# Patient Record
Sex: FEMALE | Race: BLACK OR AFRICAN AMERICAN | NOT HISPANIC OR LATINO | Employment: FULL TIME | URBAN - METROPOLITAN AREA
[De-identification: names, ages, dates, MRNs, and addresses within clinical notes are randomized per-mention and may not be internally consistent; named-entity substitution may affect disease eponyms.]

---

## 2023-10-09 ENCOUNTER — OFFICE VISIT (OUTPATIENT)
Dept: INTERNAL MEDICINE | Facility: CLINIC | Age: 48
End: 2023-10-09
Payer: COMMERCIAL

## 2023-10-09 VITALS
BODY MASS INDEX: 39.13 KG/M2 | SYSTOLIC BLOOD PRESSURE: 122 MMHG | DIASTOLIC BLOOD PRESSURE: 76 MMHG | WEIGHT: 199.31 LBS | HEIGHT: 60 IN | OXYGEN SATURATION: 98 % | TEMPERATURE: 98 F | HEART RATE: 86 BPM

## 2023-10-09 DIAGNOSIS — E66.09 CLASS 2 OBESITY DUE TO EXCESS CALORIES WITH BODY MASS INDEX (BMI) OF 38.0 TO 38.9 IN ADULT, UNSPECIFIED WHETHER SERIOUS COMORBIDITY PRESENT: ICD-10-CM

## 2023-10-09 DIAGNOSIS — E11.9 TYPE 2 DIABETES MELLITUS WITHOUT COMPLICATION, WITHOUT LONG-TERM CURRENT USE OF INSULIN: ICD-10-CM

## 2023-10-09 DIAGNOSIS — I10 PRIMARY HYPERTENSION: Primary | ICD-10-CM

## 2023-10-09 DIAGNOSIS — E55.9 VITAMIN D DEFICIENCY: ICD-10-CM

## 2023-10-09 PROCEDURE — 99999 PR PBB SHADOW E&M-EST. PATIENT-LVL IV: ICD-10-PCS | Mod: PBBFAC,,, | Performed by: FAMILY MEDICINE

## 2023-10-09 PROCEDURE — 99204 OFFICE O/P NEW MOD 45 MIN: CPT | Mod: S$GLB,,, | Performed by: FAMILY MEDICINE

## 2023-10-09 PROCEDURE — 99999 PR PBB SHADOW E&M-EST. PATIENT-LVL IV: CPT | Mod: PBBFAC,,, | Performed by: FAMILY MEDICINE

## 2023-10-09 PROCEDURE — 99204 PR OFFICE/OUTPT VISIT, NEW, LEVL IV, 45-59 MIN: ICD-10-PCS | Mod: S$GLB,,, | Performed by: FAMILY MEDICINE

## 2023-10-09 RX ORDER — CYCLOBENZAPRINE HCL 10 MG
TABLET ORAL
COMMUNITY
Start: 2018-01-01

## 2023-10-09 RX ORDER — GABAPENTIN 100 MG/1
CAPSULE ORAL
COMMUNITY
Start: 2018-01-01

## 2023-10-09 RX ORDER — METFORMIN HYDROCHLORIDE 500 MG/1
500 TABLET ORAL DAILY
Qty: 90 TABLET | Refills: 2 | Status: SHIPPED | OUTPATIENT
Start: 2023-10-09

## 2023-10-09 NOTE — PROGRESS NOTES
Subjective     Patient ID: Raul Lechuga is a 47 y.o. female.    Chief Complaint: hospitals Care    47 year old female presents to [facility]. Patient has PMH of  htn and DM2. Patient is overall doing well and reports it is time for lab work. Patient reports she has gained weight.    Hypertension  This is a chronic problem. The current episode started more than 1 year ago. The problem is unchanged. The problem is controlled. Pertinent negatives include no anxiety, blurred vision, chest pain, headaches, malaise/fatigue, neck pain, orthopnea, palpitations, peripheral edema, PND, shortness of breath or sweats. Agents associated with hypertension include NSAIDs. Risk factors for coronary artery disease include diabetes mellitus, family history, obesity, post-menopausal state and sedentary lifestyle. Past treatments include beta blockers, calcium channel blockers, diuretics and lifestyle changes. The current treatment provides significant improvement. Compliance problems include exercise.      Review of Systems   Constitutional: Negative.  Negative for malaise/fatigue.   HENT: Negative.     Eyes:  Negative for blurred vision, discharge and redness.   Respiratory: Negative.  Negative for shortness of breath.    Cardiovascular: Negative.  Negative for chest pain, palpitations, orthopnea, leg swelling and PND.   Gastrointestinal: Negative.  Negative for constipation and diarrhea.   Musculoskeletal: Negative.  Negative for arthralgias, gait problem and neck pain.   Neurological: Negative.  Negative for headaches and coordination difficulties.   Psychiatric/Behavioral: Negative.            Objective     Physical Exam  Vitals and nursing note reviewed.   Constitutional:       Appearance: Normal appearance. She is obese.   HENT:      Head: Normocephalic and atraumatic.   Eyes:      Extraocular Movements: Extraocular movements intact.   Cardiovascular:      Rate and Rhythm: Normal rate and regular rhythm.      Pulses:  Normal pulses.      Heart sounds: Normal heart sounds.   Pulmonary:      Effort: Pulmonary effort is normal.      Breath sounds: Normal breath sounds.   Abdominal:      General: Abdomen is flat. Bowel sounds are normal.      Palpations: Abdomen is soft.   Musculoskeletal:      Cervical back: Neck supple.      Right lower leg: No edema.      Left lower leg: No edema.   Skin:     General: Skin is warm and dry.   Neurological:      General: No focal deficit present.      Mental Status: She is alert and oriented to person, place, and time.   Psychiatric:         Mood and Affect: Mood normal.         Behavior: Behavior normal.            Assessment and Plan     1. Primary hypertension  Comments:  controlled cont current tx  Orders:  -     CBC W/ AUTO DIFFERENTIAL; Future; Expected date: 10/09/2023  -     COMPREHENSIVE METABOLIC PANEL; Future; Expected date: 10/09/2023  -     LIPID PANEL; Future; Expected date: 10/09/2023  -     TSH; Future; Expected date: 11/09/2023  -     T4, free; Future; Expected date: 11/09/2023    2. Type 2 diabetes mellitus without complication, without long-term current use of insulin  Comments:  on metformin therapy, check a1c  Orders:  -     HEMOGLOBIN A1C; Future; Expected date: 11/09/2023  -     metFORMIN (GLUCOPHAGE) 500 MG tablet; Take 1 tablet (500 mg total) by mouth Daily.  Dispense: 90 tablet; Refill: 2    3. Vitamin D deficiency  Comments:  on supplementation , check level to assess need to adjust dosage  Orders:  -     Calcitriol; Future; Expected date: 10/09/2023    4. Class 2 obesity due to excess calories with body mass index (BMI) of 38.0 to 38.9 in adult, unspecified whether serious comorbidity present  Comments:  discussed increasing physical activity and clean eating             No follow-ups on file.

## 2023-10-10 ENCOUNTER — PATIENT MESSAGE (OUTPATIENT)
Dept: ADMINISTRATIVE | Facility: HOSPITAL | Age: 48
End: 2023-10-10
Payer: COMMERCIAL

## 2023-11-22 DIAGNOSIS — Z12.31 OTHER SCREENING MAMMOGRAM: ICD-10-CM

## 2024-02-06 DIAGNOSIS — Z12.11 COLON CANCER SCREENING: ICD-10-CM

## 2024-07-18 ENCOUNTER — PATIENT MESSAGE (OUTPATIENT)
Dept: INTERNAL MEDICINE | Facility: CLINIC | Age: 49
End: 2024-07-18
Payer: COMMERCIAL

## 2024-07-19 LAB — HIV1/HIV2 ANTIBODY: NONREACTIVE

## 2024-07-23 ENCOUNTER — OFFICE VISIT (OUTPATIENT)
Dept: INTERNAL MEDICINE | Facility: CLINIC | Age: 49
End: 2024-07-23
Payer: COMMERCIAL

## 2024-07-23 VITALS
BODY MASS INDEX: 39 KG/M2 | SYSTOLIC BLOOD PRESSURE: 132 MMHG | DIASTOLIC BLOOD PRESSURE: 82 MMHG | TEMPERATURE: 99 F | WEIGHT: 206.56 LBS | HEIGHT: 61 IN | HEART RATE: 85 BPM | OXYGEN SATURATION: 97 %

## 2024-07-23 DIAGNOSIS — G89.29 CHRONIC LOW BACK PAIN, UNSPECIFIED BACK PAIN LATERALITY, UNSPECIFIED WHETHER SCIATICA PRESENT: ICD-10-CM

## 2024-07-23 DIAGNOSIS — E66.01 CLASS 2 SEVERE OBESITY DUE TO EXCESS CALORIES WITH SERIOUS COMORBIDITY AND BODY MASS INDEX (BMI) OF 39.0 TO 39.9 IN ADULT: ICD-10-CM

## 2024-07-23 DIAGNOSIS — I10 PRIMARY HYPERTENSION: Primary | ICD-10-CM

## 2024-07-23 DIAGNOSIS — M54.50 CHRONIC LOW BACK PAIN, UNSPECIFIED BACK PAIN LATERALITY, UNSPECIFIED WHETHER SCIATICA PRESENT: ICD-10-CM

## 2024-07-23 DIAGNOSIS — E11.9 TYPE 2 DIABETES MELLITUS WITHOUT COMPLICATION, WITHOUT LONG-TERM CURRENT USE OF INSULIN: ICD-10-CM

## 2024-07-23 PROCEDURE — 99999 PR PBB SHADOW E&M-EST. PATIENT-LVL III: CPT | Mod: PBBFAC,,, | Performed by: FAMILY MEDICINE

## 2024-07-23 PROCEDURE — 99214 OFFICE O/P EST MOD 30 MIN: CPT | Mod: S$GLB,,, | Performed by: FAMILY MEDICINE

## 2024-07-23 RX ORDER — CYCLOBENZAPRINE HCL 10 MG
10 TABLET ORAL 3 TIMES DAILY PRN
Qty: 30 TABLET | Refills: 1 | Status: SHIPPED | OUTPATIENT
Start: 2024-07-23

## 2024-07-23 NOTE — PROGRESS NOTES
"Subjective     Patient ID: Raul Lechuga is a 48 y.o. female.    Chief Complaint: Follow-up    History of Present Illness    Raul presents today for follow up.    She reports significant weight gain since her last visit. She acknowledges a lack of exercise, no longer engaging in physical activity as she previously did. She admits to being less motivated to exercise, often choosing to sleep longer instead of maintaining her previous morning routine. Regarding diet, she reports poor eating habits, indicating a departure from her previous healthier eating patterns. She denies needing assistance with weight management or enrollment in a health program, expressing confidence in her ability to address the issue independently.    She reports a recent episode of elevated blood pressure of approximately 160 during her visit to the cardiologist, which was unusually high compared to her typical readings. The cardiologist had her wait in the office for a few minutes before rechecking the blood pressure. She expresses uncertainty about the cause of this elevated reading, noting that while her blood pressure is "not great," it is usually closer to her normal range. She denies chest pain.    She reports back pain, primarily characterized by muscle spasms. She uses stretching exercises and a massage chair at work for pain management. She expresses a preference for non-pharmacological management, stating she is "not a medicine person." When medication is necessary, she uses Flexeril (cyclobenzaprine) 5-10mg, but notes that the medication's sedating effects limit its usefulness to only at home when ready to sleep. She denies chest pain or other associated symptoms.    She reports facial spasms with negative CTs. Her doctor (Gloria) has stated that unless the spasms escalate, no further action is needed and suggested the possibility of trying Botox injections in the nerves if she wishes to pursue treatment, but she has " declined this option. She denies current severe pain or need for escalated treatment.    She reports needing a refill for Flexeril, which typically lasts her a year indicating minimal usage. She denies needing refills for other medications, mentioning she has sufficient supplies of gabapentin and other prescribed medications.    She reports recent completion of mammogram and pelvic exam with Dr. Story. The mammogram and all other tests performed were negative. She denies need for Pap smear at this time. She acknowledges need to schedule a colonoscopy and expresses interest in recommendations for providers.    ROS:  General: -fever, -chills, -fatigue, +weight gain, -weight loss  Eyes: -vision changes, -redness, -discharge  ENT: -ear pain, -nasal congestion, -sore throat  Cardiovascular: -chest pain, -palpitations, -lower extremity edema  Respiratory: -cough, -shortness of breath  Gastrointestinal: -abdominal pain, -nausea, -vomiting, -diarrhea, -constipation, -blood in stool  Genitourinary: -dysuria, -hematuria, -frequency  Musculoskeletal: -joint pain, -muscle pain, +back pain, +muscle spasms  Skin: -rash, -lesion  Neurological: -headache, -dizziness, -numbness, -tingling  Psychiatric: -anxiety, -depression, -sleep difficulty            Objective     Physical Exam  Vitals and nursing note reviewed.   Constitutional:       Appearance: Normal appearance. She is obese.   HENT:      Head: Normocephalic and atraumatic.   Eyes:      Extraocular Movements: Extraocular movements intact.      Conjunctiva/sclera: Conjunctivae normal.   Cardiovascular:      Rate and Rhythm: Normal rate and regular rhythm.      Pulses: Normal pulses.      Heart sounds: Normal heart sounds.   Pulmonary:      Effort: Pulmonary effort is normal.      Breath sounds: Normal breath sounds.   Abdominal:      General: Abdomen is flat. Bowel sounds are normal.      Palpations: Abdomen is soft.   Musculoskeletal:      Right lower leg: No edema.      Left  lower leg: No edema.   Skin:     General: Skin is warm and dry.   Neurological:      General: No focal deficit present.      Mental Status: She is alert and oriented to person, place, and time.   Psychiatric:         Mood and Affect: Mood normal.         Behavior: Behavior normal.                    Assessment and Plan     1. Primary hypertension    2. Type 2 diabetes mellitus without complication, without long-term current use of insulin    3. Chronic low back pain, unspecified back pain laterality, unspecified whether sciatica present  -     cyclobenzaprine (FLEXERIL) 10 MG tablet; Take 1 tablet (10 mg total) by mouth 3 (three) times daily as needed for Muscle spasms.  Dispense: 30 tablet; Refill: 1    4. Class 2 severe obesity due to excess calories with serious comorbidity and body mass index (BMI) of 39.0 to 39.9 in adult        Assessment & Plan    BACK PAIN:   Discussed non-pharmacologic strategies for managing back pain, including stretching against a wall.   The patient reports ongoing back pain and muscle spasms.   Acknowledged the patient's back pain and SI joint pain.   Discussed previous management strategies for back pain with the patient.   The patient uses non-pharmacological methods like massage chair for pain relief.   Discussed non-pharmacologic strategies for managing muscle spasms with the patient, including stretching against a wall.   Suggested the possibility of a pinched nerve in back causing the patient's facial spasms.  MUSCLE SPASMS:   The patient uses flexeril (cyclobenzaprine) 5-10mg for pain management, but experiences side effects.   Continued flexeril 10 mg as needed for the patient's muscle spasms.   Noted the patient takes flexeril minimally, with 1 prescription lasting a year.   Advised the patient to break flexeril in half if needed to reduce side effects.   The patient reports ongoing muscle spasms in back and face.   CTs are negative for the patient's facial spasms.   The  patient uses flexeril (cyclobenzaprine) for muscle spasms, but experiences side effects.   Suggested Botox injections as a potential treatment option for the patient's muscle spasms.  HYPERTENSION:   Continued the patient's current antihypertensive medications.   The patient's blood pressure was high during a recent visit with Dr. Knox, reaching 160/100 initially, then decreasing to 148/90-something.   The patient reports blood pressure has returned to normal levels in recent checks.   Noted that the patient's blood pressure is currently looking good.   Dr. Knox suggested the patient contact Dr. Moore if blood pressure remains elevated to consider medication changes.   The patient is on blood pressure medication, but may need refills or adjustments.  COLONOSCOPY:   Referred the patient to colonoscopy once the patient confirms an in-network provider.   Discussed potential providers with the patient, including Dr. Grissom, Dr. Hollis, and Dr. Cardoso.   Instructed the patient to let me know if referral is needed to an outside facility.   Advised the patient to use the patient portal to send a message once the patient determines where the patient wants to have the colonoscopy done so I can place the order.   Recommend colonoscopy as the initial screening method for the patient over Cologuard.   Offered to order a colonoscopy for the patient.  WEIGHT MANAGEMENT:   The patient has gained weight since last year and is now over 200 lbs.   Discussed the patient's weight gain and need to get back on track with diet and exercise.   Offered resources for weight management to the patient, including wellness centers and health programs.   Trinical to resume exercising regularly and eating a healthier diet to assist with weight loss efforts.   Trinical to enroll in wellness center program if needing additional motivation for lifestyle changes.  MEDICATIONS/SUPPLEMENTS:   Continued gabapentin at current dose.  FOLLOW UP:    Instructed the patient to contact the office if needing medication refills, including flexeril which is the only medication the patient is currently out of.              Follow up in about 6 months (around 1/23/2025).    This note was generated with the assistance of ambient listening technology. Verbal consent was obtained by the patient and accompanying visitor(s) for the recording of patient appointment to facilitate this note. I attest to having reviewed and edited the generated note for accuracy, though some syntax or spelling errors may persist. Please contact the author of this note for any clarification.

## 2024-07-24 ENCOUNTER — PATIENT MESSAGE (OUTPATIENT)
Dept: ADMINISTRATIVE | Facility: HOSPITAL | Age: 49
End: 2024-07-24
Payer: COMMERCIAL

## 2024-07-31 DIAGNOSIS — E11.9 TYPE 2 DIABETES MELLITUS WITHOUT COMPLICATION: ICD-10-CM

## 2024-08-01 RX ORDER — NEBIVOLOL 20 MG/1
1 TABLET ORAL
Qty: 90 TABLET | Refills: 3 | Status: SHIPPED | OUTPATIENT
Start: 2024-08-01

## 2024-08-01 RX ORDER — VALSARTAN AND HYDROCHLOROTHIAZIDE 320; 25 MG/1; MG/1
1 TABLET, FILM COATED ORAL
Qty: 90 TABLET | Refills: 3 | Status: SHIPPED | OUTPATIENT
Start: 2024-08-01

## 2024-08-01 NOTE — TELEPHONE ENCOUNTER
Care Due:                  Date            Visit Type   Department     Provider  --------------------------------------------------------------------------------                                MYCHART                              FOLLOWUP/OF  [facility] INTERNAL  Jami  Last Visit: 07-      FICE VISIT   AYALA Conway  Next Visit: None Scheduled  None         None Found                                                            Last  Test          Frequency    Reason                     Performed    Due Date  --------------------------------------------------------------------------------    Cr..........  12 months..  metFORMIN................  Not Found    Overdue    HBA1C.......  6 months...  metFORMIN................  Not Found    Overdue    [employer] Embedded Care Due Messages. Reference number: [id number].   8/01/2024 12:21:11 PM CDT

## 2024-08-01 NOTE — TELEPHONE ENCOUNTER
Refill Routing Note   Medication(s) are not appropriate for processing by [facility] for the following reason(s):        No active prescription written by provider    [facility] action(s):  Defer   Requires labs : Yes        Medication Therapy Plan: Historical Medication, medication not active.      Appointments  past 12m or future 3m with PCP    Date Provider   Last Visit   7/23/2024 Jami Conway MD   Next Visit   Visit date not found Jami Conway MD   ED visits in past 90 days: 0        Note composed:12:49 PM 08/01/2024

## 2024-12-29 NOTE — TELEPHONE ENCOUNTER
No care due was identified.  [facility] Embedded Care Due Messages. Reference number: [id number].   12/29/2024 10:36:09 AM CST

## 2024-12-30 RX ORDER — NEBIVOLOL 20 MG/1
1 TABLET ORAL DAILY
Qty: 90 TABLET | Refills: 1 | Status: SHIPPED | OUTPATIENT
Start: 2024-12-30

## 2024-12-30 NOTE — TELEPHONE ENCOUNTER
Refill Decision Note   Raul Lechuga  is requesting a refill authorization.  Brief Assessment and Rationale for Refill:  Approve     Medication Therapy Plan:         Comments:     Note composed:12:27 PM 12/30/2024

## 2025-03-10 RX ORDER — VALSARTAN AND HYDROCHLOROTHIAZIDE 320; 25 MG/1; MG/1
1 TABLET, FILM COATED ORAL DAILY
Qty: 90 TABLET | Refills: 3 | Status: SHIPPED | OUTPATIENT
Start: 2025-03-10

## 2025-03-10 NOTE — TELEPHONE ENCOUNTER
Care Due:                  Date            Visit Type   Department     Provider  --------------------------------------------------------------------------------                                MYCHART                              FOLLOWUP/OF  [facility] INTERNAL  Jami  Last Visit: 07-      FICE VISIT   AYALA Conway                              MYCHART                              FOLLOWUP/OF  [facility] INTERNAL  Jami  Next Visit: 03-      FICE VISIT   [facility]       Man                                                            Last  Test          Frequency    Reason                     Performed    Due Date  --------------------------------------------------------------------------------    CMP.........  12 months..  metFORMIN,                 Not Found    Overdue                             valsartan-hydrochlorothia                             zide.....................    HBA1C.......  6 months...  metFORMIN................  Not Found    Overdue    [employer] Embedded Care Due Messages. Reference number: [id number].   3/10/2025 11:42:08 AM CDT

## 2025-03-24 ENCOUNTER — OFFICE VISIT (OUTPATIENT)
Dept: INTERNAL MEDICINE | Facility: CLINIC | Age: 50
End: 2025-03-24
Payer: COMMERCIAL

## 2025-03-24 VITALS
HEIGHT: 61 IN | DIASTOLIC BLOOD PRESSURE: 96 MMHG | OXYGEN SATURATION: 99 % | TEMPERATURE: 98 F | BODY MASS INDEX: 38.88 KG/M2 | SYSTOLIC BLOOD PRESSURE: 144 MMHG | HEART RATE: 69 BPM | WEIGHT: 205.94 LBS

## 2025-03-24 DIAGNOSIS — E66.812 CLASS 2 SEVERE OBESITY DUE TO EXCESS CALORIES WITH SERIOUS COMORBIDITY AND BODY MASS INDEX (BMI) OF 38.0 TO 38.9 IN ADULT: ICD-10-CM

## 2025-03-24 DIAGNOSIS — K21.9 GASTROESOPHAGEAL REFLUX DISEASE WITHOUT ESOPHAGITIS: Chronic | ICD-10-CM

## 2025-03-24 DIAGNOSIS — E11.65 TYPE 2 DIABETES MELLITUS WITH HYPERGLYCEMIA, WITHOUT LONG-TERM CURRENT USE OF INSULIN: Chronic | ICD-10-CM

## 2025-03-24 DIAGNOSIS — Z12.11 SPECIAL SCREENING FOR MALIGNANT NEOPLASM OF COLON: ICD-10-CM

## 2025-03-24 DIAGNOSIS — Z00.00 ROUTINE GENERAL MEDICAL EXAMINATION AT A HEALTH CARE FACILITY: Primary | ICD-10-CM

## 2025-03-24 DIAGNOSIS — I10 PRIMARY HYPERTENSION: Chronic | ICD-10-CM

## 2025-03-24 DIAGNOSIS — E66.01 CLASS 2 SEVERE OBESITY DUE TO EXCESS CALORIES WITH SERIOUS COMORBIDITY AND BODY MASS INDEX (BMI) OF 38.0 TO 38.9 IN ADULT: ICD-10-CM

## 2025-03-24 PROCEDURE — 99999 PR PBB SHADOW E&M-EST. PATIENT-LVL IV: CPT | Mod: PBBFAC,,, | Performed by: FAMILY MEDICINE

## 2025-03-24 RX ORDER — TIRZEPATIDE 2.5 MG/.5ML
2.5 INJECTION, SOLUTION SUBCUTANEOUS
Qty: 4 PEN | Refills: 0 | Status: SHIPPED | OUTPATIENT
Start: 2025-03-24

## 2025-03-28 ENCOUNTER — PATIENT OUTREACH (OUTPATIENT)
Dept: ADMINISTRATIVE | Facility: HOSPITAL | Age: 50
End: 2025-03-28
Payer: COMMERCIAL

## 2025-03-28 NOTE — PROGRESS NOTES
[facility] DM EYE EXAM 3.25.2025 uploaded; faxed to DR. LOJA 1x to request. Reminder set.    Manually uploaded and hyper-linked HIV 7.19.2024 found in CE.   Name band;

## 2025-04-10 PROBLEM — E11.65 TYPE 2 DIABETES MELLITUS WITH HYPERGLYCEMIA, WITHOUT LONG-TERM CURRENT USE OF INSULIN: Chronic | Status: ACTIVE | Noted: 2025-04-10

## 2025-04-10 PROBLEM — I10 PRIMARY HYPERTENSION: Chronic | Status: ACTIVE | Noted: 2025-04-10

## 2025-04-10 PROBLEM — K21.9 GASTROESOPHAGEAL REFLUX DISEASE WITHOUT ESOPHAGITIS: Chronic | Status: ACTIVE | Noted: 2025-04-10

## 2025-04-10 NOTE — PROGRESS NOTES
Subjective     Patient ID: Raul Lechuga is a 49 y.o. female.    Chief Complaint: Hypertension, Annual Exam, and Diabetes    History of Present Illness    Raul presents for annual exam.    HPI:  Raul reports elevated blood pressure recently, with readings around 170 during her last visit. Dr. Vergara had previously noted the elevation but did not change her medication at that time. She acknowledges reduced exercise routine lately, which may be contributing to the blood pressure issue.    Regarding diabetes management, her fasting blood sugar have been high, ranging from 150 to 200 in the morning, especially after eating later in the evening. She notes lower blood sugar midday. She discontinued metformin about a month ago due to significant GI side effects, including stomach issues and acid reflux. She attempted various methods to mitigate the side effects, such as altering medication timing, but ultimately found it intolerable.    She notes minor swelling in her left ankle, typically after prolonged sitting, driving, or flying. She uses compression socks for prevention when necessary.    She denies significant fluid retention or increased urination.    MEDICATIONS:  Raul is on Telmisartan for blood pressure control. She discontinued metformin approximately 1 month ago due to stomach issues and acid reflux.    MEDICAL HISTORY:  Raul has hypertension, Type 2 Diabetes Mellitus, and acid reflux.    FAMILY HISTORY:  Family history is significant for heart disease.    TEST RESULTS:  Raul's last A1c result was 7.2. Her recent morning glucose readings have been between 150-200. Raul has an annual wellness exam scheduled. She completes an eye exam annually, usually in August or September with Dr. Hardy.      ROS:  General: -fever, -chills, -fatigue, -weight gain, -weight loss  Eyes: -vision changes, -redness, -discharge  ENT: -ear pain, -nasal congestion, -sore throat  Cardiovascular: -chest  pain, -palpitations, +lower extremity edema  Respiratory: -cough, -shortness of breath  Gastrointestinal: -abdominal pain, -nausea, -vomiting, -diarrhea, -constipation, -blood in stool, +heartburn, +indigestion  Genitourinary: -dysuria, -hematuria, -frequency  Musculoskeletal: -joint pain, -muscle pain  Skin: -rash, -lesion, +acne  Neurological: -headache, -dizziness, -numbness, -tingling  Psychiatric: -anxiety, -depression, -sleep difficulty            Objective     Physical Exam  Vitals and nursing note reviewed.   Constitutional:       Appearance: Normal appearance. She is obese.   HENT:      Head: Normocephalic and atraumatic.      Right Ear: Tympanic membrane, ear canal and external ear normal.      Left Ear: Tympanic membrane, ear canal and external ear normal.      Nose: Nose normal.      Mouth/Throat:      Mouth: Mucous membranes are moist.      Pharynx: Oropharynx is clear.   Eyes:      Extraocular Movements: Extraocular movements intact.      Conjunctiva/sclera: Conjunctivae normal.   Cardiovascular:      Rate and Rhythm: Normal rate and regular rhythm.      Pulses: Normal pulses.      Heart sounds: Normal heart sounds.   Pulmonary:      Effort: Pulmonary effort is normal.      Breath sounds: Normal breath sounds.   Abdominal:      General: Abdomen is flat. Bowel sounds are normal.      Palpations: Abdomen is soft.   Musculoskeletal:         General: Normal range of motion.      Cervical back: Normal range of motion and neck supple.      Right lower leg: No edema.      Left lower leg: No edema.   Skin:     General: Skin is warm and dry.   Neurological:      General: No focal deficit present.      Mental Status: She is alert and oriented to person, place, and time.   Psychiatric:         Mood and Affect: Mood normal.         Behavior: Behavior normal.                Assessment and Plan     1. Routine general medical examination at a health care facility  Comments:  reviewed age appropriate screenings and  immunizations    2. Primary hypertension  Comments:  uncontrolled, recommended low na diet, cont bystolic, diovan hct    3. Type 2 diabetes mellitus with hyperglycemia, without long-term current use of insulin  -     tirzepatide (MOUNJARO) 2.5 mg/0.5 mL PnIj; Inject 2.5 mg into the skin every 7 days.  Dispense: 4 Pen; Refill: 0    4. Special screening for malignant neoplasm of colon  -     Ambulatory referral/consult to Endo Procedure ; Future; Expected date: 03/25/2025    5. Class 2 severe obesity due to excess calories with serious comorbidity and body mass index (BMI) of 38.0 to 38.9 in adult    6. Gastroesophageal reflux disease without esophagitis  Comments:  cont low acid diet, stable        Assessment & Plan    Discontinued Metformin due to intolerance and side effects.  Recommend GLP-1 agonist (Mounjaro) for blood sugar control, weight loss, and cardiovascular risk reduction. Favored Mounjaro over Ozempic for potentially better weight loss outcomes. Plan gradual titration of Mounjaro to minimize side effects and optimize efficacy.  Noted elevated blood pressure, will monitor for improvement with GLP-1 therapy.    PATIENT EDUCATION:   Explained mechanism of action for GLP-1 agonists, including slowed digestion and increased satiety.   Discussed potential side effects of GLP-1 agonists, including nausea and constipation.   Informed about the cardiovascular and renal benefits of GLP-1 agonists, particularly in patients with diabetes and HTN.    ACTION ITEMS/LIFESTYLE:   Trinical to consume at least 100 g of protein daily through meat, vegetables, or protein supplements. Trinical to take a daily multivitamin.    MEDICATIONS:   Started Mounjaro 2.5 mg subcutaneous injection weekly for 1 month; increase to 5 mg weekly after 1 month, continue for 2 months; take injection in the morning; rotate injection sites between stomach, upper thigh, and back of arm to minimize side effects.   Educated on the  importance of protein intake and multivitamin supplementation while on GLP-1 therapy.    ORDERS:   Ordered CBC, CMP, lipid panel, A1c, and TSH.    REFERRALS:   Referred to gastroenterologist for colonoscopy.    FOLLOW UP:   Follow up in 3 months to reassess blood sugar control and weight loss progress.   Contact the office if experiencing significant side effects from Mounjaro.       Not ready for vaccines today. Will obtain eye exam. Patient to follow up with podiatry for foot exam. Patient has upcoming labs.       Follow up in about 3 months (around 6/24/2025) for Sign [facility] Exam Dr. Grey at [facility].    This note was generated with the assistance of ambient listening technology. Verbal consent was obtained by the patient and accompanying visitor(s) for the recording of patient appointment to facilitate this note. I attest to having reviewed and edited the generated note for accuracy, though some syntax or spelling errors may persist. Please contact the author of this note for any clarification.

## 2025-04-28 DIAGNOSIS — E11.65 TYPE 2 DIABETES MELLITUS WITH HYPERGLYCEMIA, WITHOUT LONG-TERM CURRENT USE OF INSULIN: Chronic | ICD-10-CM

## 2025-04-28 NOTE — TELEPHONE ENCOUNTER
No care due was identified.  [facility] Embedded Care Due Messages. Reference number: [id number].   4/28/2025 3:55:28 PM CDT

## 2025-04-28 NOTE — TELEPHONE ENCOUNTER
----- Message from Brii sent at 4/28/2025  3:44 PM CDT -----  Contact: Raul  .TYPE: Patient Call BackWho called:PatientWhat is the request in detail:  Patient returned call back to inform provider that 5mg  tirzepatide (MOUNJARO) is good. Please give call back. Thank you Can the clinic reply by [facility]?   Would the patient rather a call back or a response via [facility] call back number:.[phone]

## 2025-05-27 ENCOUNTER — PATIENT MESSAGE (OUTPATIENT)
Dept: INTERNAL MEDICINE | Facility: CLINIC | Age: 50
End: 2025-05-27
Payer: COMMERCIAL

## 2025-05-27 NOTE — TELEPHONE ENCOUNTER
Refill Routing Note   Medication(s) are not appropriate for processing by [facility] for the following reason(s):        New or recently adjusted medication  Required labs outdated    [facility] action(s):  Defer     Requires labs : Yes             Appointments  past 12m or future 3m with PCP    Date Provider   Last Visit   3/24/2025 Jami Conway MD   Next Visit   6/23/2025 Jami Conway MD   ED visits in past 90 days: 0        Note composed:10:28 AM 05/27/2025

## 2025-05-27 NOTE — TELEPHONE ENCOUNTER
Care Due:                  Date            Visit Type   Department     Provider  --------------------------------------------------------------------------------                                MYCHART                              FOLLOWUP/OF  [facility] INTER[facility]  Jami  Last Visit: 03-      FICE VISIT   MEDICINE       Man                              EP -                              PRIMARY      [facility]  Jami  Next Visit: 06-      CARE ([facility])   MEDICINE       Man                                                            Last  Test          Frequency    Reason                     Performed    Due Date  --------------------------------------------------------------------------------    CMP.........  12 months..  valsartan-hydrochlorothia  Not Found    Overdue                             zide.....................    HBA1C.......  6 months...  tirzepatide..............  Not Found    Overdue    [employer] Embedded Care Due Messages. Reference number: [id number].   5/27/2025 9:19:22 AM CDT

## 2025-05-30 ENCOUNTER — PATIENT MESSAGE (OUTPATIENT)
Dept: INTERNAL MEDICINE | Facility: CLINIC | Age: 50
End: 2025-05-30
Payer: COMMERCIAL

## 2025-05-30 RX ORDER — ONDANSETRON 4 MG/1
4 TABLET, ORALLY DISINTEGRATING ORAL EVERY 6 HOURS PRN
Qty: 30 TABLET | Refills: 1 | Status: SHIPPED | OUTPATIENT
Start: 2025-05-30

## 2025-06-23 ENCOUNTER — OFFICE VISIT (OUTPATIENT)
Dept: INTERNAL MEDICINE | Facility: CLINIC | Age: 50
End: 2025-06-23
Payer: COMMERCIAL

## 2025-06-23 VITALS
SYSTOLIC BLOOD PRESSURE: 118 MMHG | HEART RATE: 103 BPM | BODY MASS INDEX: 37.25 KG/M2 | HEIGHT: 61 IN | OXYGEN SATURATION: 98 % | TEMPERATURE: 97 F | DIASTOLIC BLOOD PRESSURE: 80 MMHG | WEIGHT: 197.31 LBS

## 2025-06-23 DIAGNOSIS — I10 PRIMARY HYPERTENSION: Chronic | ICD-10-CM

## 2025-06-23 DIAGNOSIS — Z23 NEED FOR VACCINATION: ICD-10-CM

## 2025-06-23 DIAGNOSIS — E11.65 TYPE 2 DIABETES MELLITUS WITH HYPERGLYCEMIA, WITHOUT LONG-TERM CURRENT USE OF INSULIN: Primary | Chronic | ICD-10-CM

## 2025-06-23 PROCEDURE — 99214 OFFICE O/P EST MOD 30 MIN: CPT | Mod: 25,S$GLB,, | Performed by: FAMILY MEDICINE

## 2025-06-23 PROCEDURE — 90750 HZV VACC RECOMBINANT IM: CPT | Mod: S$GLB,,, | Performed by: FAMILY MEDICINE

## 2025-06-23 PROCEDURE — G2211 COMPLEX E/M VISIT ADD ON: HCPCS | Mod: S$GLB,,, | Performed by: FAMILY MEDICINE

## 2025-06-23 PROCEDURE — 99999 PR PBB SHADOW E&M-EST. PATIENT-LVL IV: CPT | Mod: PBBFAC,,, | Performed by: FAMILY MEDICINE

## 2025-06-23 PROCEDURE — 90471 IMMUNIZATION ADMIN: CPT | Mod: S$GLB,,, | Performed by: FAMILY MEDICINE

## 2025-06-23 RX ORDER — MULTIVITAMIN
1 TABLET ORAL DAILY
COMMUNITY

## 2025-06-30 NOTE — PROGRESS NOTES
Subjective     Patient ID: Raul Lechuga is a 49 y.o. female.    Chief Complaint: Follow-up (3 months)    History of Present Illness    PRESENTATION:  Raul presents for a follow-up visit to discuss blood pressure and blood sugar management.    HPI:  Raul reports excellent blood pressure recently, though it has been inconsistent in the past. She has not checked her blood sugar in the last 2 weeks but states it seemed to be improving overall. Raul is currently taking Mounjaro (semaglutide) injections for diabetes management. She had nausea when she first started on the 2.5 mg dose and when she delayed eating. She did not have any nausea during a recent trip to the [facility] despite eating and drinking liberally. Raul is currently on her second month of Mounjaro injections and is scheduled to  her third vial soon.    Raul notes changes in bowel habits, reporting 1-2 bowel movements daily instead of after every meal as before, partly due to eating less from the medication. She denies hard stools or constipation. She reports urinating twice during the night, which she attributes to drinking water later in the evening.    Raul mentions attending a wedding recently and [place] [employer] [place] [facility]. She reports no swelling during her flight.    Raul denies chest pain, shortness of breath, or sleep disturbances.    MEDICATIONS:  Raul is on Valsartan and Nebivolol for blood pressure control. She is currently taking Mounjaro (semaglutide) 5 mg for diabetes management and potentially weight loss, which was recently increased from 2.5 mg. This is her second month on the medication, and she reports side effects of less frequent bowel movements and reduced appetite. Raul is also on Zofran (ondansetron) as needed for nausea, with 7 pills remaining.    MEDICAL HISTORY:  Raul has hypertension and diabetes.    TEST RESULTS:  Raul's last A1C was over 7. Her most recent kidney  function tests showed no problems. The last blood count was fine, and cholesterol levels were normal. Raul recently completed an eye exam, with results pending.      ROS:  General: -fever, -chills, -fatigue, -weight gain, -weight loss  Eyes: -vision changes, -redness, -discharge  ENT: -ear pain, -nasal congestion, -sore throat  Cardiovascular: -chest pain, -palpitations, -lower extremity edema  Respiratory: -cough, -shortness of breath  Gastrointestinal: -abdominal pain, -nausea, -vomiting, -diarrhea, -constipation, -blood in stool, +change in bowel habits, +loss of appetite  Genitourinary: -dysuria, -hematuria, -frequency, +nocturia  Musculoskeletal: -joint pain, -muscle pain  Skin: -rash, -lesion  Neurological: -headache, -dizziness, -numbness, -tingling  Psychiatric: -anxiety, -depression, -sleep difficulty            Objective     Physical Exam  Vitals and nursing note reviewed.   Constitutional:       Appearance: Normal appearance. She is obese.   HENT:      Head: Normocephalic and atraumatic.   Eyes:      Extraocular Movements: Extraocular movements intact.      Conjunctiva/sclera: Conjunctivae normal.   Cardiovascular:      Rate and Rhythm: Normal rate and regular rhythm.      Pulses: Normal pulses.      Heart sounds: Normal heart sounds.   Pulmonary:      Effort: Pulmonary effort is normal.      Breath sounds: Normal breath sounds.   Musculoskeletal:      Cervical back: Normal range of motion and neck supple.   Skin:     General: Skin is warm and dry.   Neurological:      General: No focal deficit present.      Mental Status: She is alert and oriented to person, place, and time.   Psychiatric:         Mood and Affect: Mood normal.         Behavior: Behavior normal.                Assessment and Plan     1. Type 2 diabetes mellitus with hyperglycemia, without long-term current use of insulin    2. Primary hypertension    3. Need for vaccination  -     varicella zoster (Shingrix) IM vaccine (>/= 50  yo)        Assessment & Plan    Assessed BP control, noting improvement.  Evaluated diabetes management, emphasizing importance of A1C follow-up due to previous elevated result (>7).  Reviewed recent lab results, noting normal renal function, blood count, and cholesterol.  Considered Mounjaro dosage adjustment based on glucose control and weight management goals.  Assessed for potential side effects of current medications, including GI changes.    PATIENT EDUCATION:   Explained importance of consistent glucose monitoring and A1C testing for diabetes management.   Discussed potential side effects of Mounjaro, including changes in bowel habits.    ACTION ITEMS/LIFESTYLE:   Trinical to complete colonoscopy.    MEDICATIONS:   Continued Mounjaro 5 mg injections, with instructions to monitor glucose levels and report back on effectiveness.   Continued valsartan and Nebivolol at current doses.   Continued Zofran for nausea as needed.    ORDERS:   Ordered annual lab work, including A1C and urine test.   Ordered shingles vaccine to be administered in office.    FOLLOW UP:   Follow up in 3 months to reassess diabetes and BP control.   Contact the office when annual lab work is completed.   Message the office after this month's Mounjaro injections to report on glucose levels and discuss potential dose adjustments.              Follow up in about 3 months (around 9/23/2025).    This note was generated with the assistance of ambient listening technology. Verbal consent was obtained by the patient and accompanying visitor(s) for the recording of patient appointment to facilitate this note. I attest to having reviewed and edited the generated note for accuracy, though some syntax or spelling errors may persist. Please contact the author of this note for any clarification.

## 2025-07-14 ENCOUNTER — PATIENT MESSAGE (OUTPATIENT)
Dept: INTERNAL MEDICINE | Facility: CLINIC | Age: 50
End: 2025-07-14
Payer: COMMERCIAL

## 2025-07-14 ENCOUNTER — PATIENT OUTREACH (OUTPATIENT)
Dept: ADMINISTRATIVE | Facility: HOSPITAL | Age: 50
End: 2025-07-14
Payer: COMMERCIAL

## 2025-07-14 NOTE — PROGRESS NOTES
Received signed SARA via fax in box.  Uploaded to media    SARA e faxed for eye exam report. Signed Authorization attached.  Reminder set

## 2025-07-14 NOTE — LETTER
AUTHORIZATION FOR RELEASE OF   CONFIDENTIAL INFORMATION        We are seeing Raul Lechuga, date of birth 1975, in the clinic at [facility]. Jami Conway MD is the patient's PCP. Raul Lechuga has an outstanding lab/procedure at the time we reviewed her chart. In order to help keep her health information updated, she has authorized us to request the following medical record(s):                                            ( x )  EYE EXAM                 Please fax records to [facility], Jones-Pedescleaux, Stacy D, MD,    at [phone] or email to [email or web address]            Patient Name: Raul Lechuga  : 1975  Patient Phone #: [phone]

## 2025-07-24 ENCOUNTER — PATIENT MESSAGE (OUTPATIENT)
Dept: INTERNAL MEDICINE | Facility: CLINIC | Age: 50
End: 2025-07-24
Payer: COMMERCIAL

## 2025-07-25 DIAGNOSIS — E11.65 TYPE 2 DIABETES MELLITUS WITH HYPERGLYCEMIA, WITHOUT LONG-TERM CURRENT USE OF INSULIN: Primary | ICD-10-CM

## 2025-07-30 ENCOUNTER — PATIENT MESSAGE (OUTPATIENT)
Dept: INTERNAL MEDICINE | Facility: CLINIC | Age: 50
End: 2025-07-30
Payer: COMMERCIAL

## 2025-08-19 ENCOUNTER — PATIENT MESSAGE (OUTPATIENT)
Dept: ADMINISTRATIVE | Facility: HOSPITAL | Age: 50
End: 2025-08-19
Payer: COMMERCIAL